# Patient Record
Sex: FEMALE | Race: WHITE | NOT HISPANIC OR LATINO | Employment: FULL TIME | URBAN - METROPOLITAN AREA
[De-identification: names, ages, dates, MRNs, and addresses within clinical notes are randomized per-mention and may not be internally consistent; named-entity substitution may affect disease eponyms.]

---

## 2021-04-16 ENCOUNTER — BULK ORDERING (OUTPATIENT)
Dept: CASE MANAGEMENT | Facility: OTHER | Age: 36
End: 2021-04-16

## 2021-04-16 DIAGNOSIS — Z23 IMMUNIZATION DUE: ICD-10-CM

## 2023-08-03 ENCOUNTER — TELEPHONE (OUTPATIENT)
Dept: GASTROENTEROLOGY | Facility: CLINIC | Age: 38
End: 2023-08-03
Payer: COMMERCIAL

## 2023-08-03 ENCOUNTER — OFFICE VISIT (OUTPATIENT)
Dept: GASTROENTEROLOGY | Facility: CLINIC | Age: 38
End: 2023-08-03
Payer: COMMERCIAL

## 2023-08-03 VITALS
TEMPERATURE: 96.9 F | SYSTOLIC BLOOD PRESSURE: 94 MMHG | DIASTOLIC BLOOD PRESSURE: 62 MMHG | OXYGEN SATURATION: 97 % | HEART RATE: 68 BPM | WEIGHT: 149 LBS | HEIGHT: 67 IN | BODY MASS INDEX: 23.39 KG/M2

## 2023-08-03 DIAGNOSIS — K58.0 IRRITABLE BOWEL SYNDROME WITH DIARRHEA: Primary | ICD-10-CM

## 2023-08-03 PROCEDURE — 99203 OFFICE O/P NEW LOW 30 MIN: CPT | Performed by: INTERNAL MEDICINE

## 2023-08-07 ENCOUNTER — TELEPHONE (OUTPATIENT)
Dept: GASTROENTEROLOGY | Facility: CLINIC | Age: 38
End: 2023-08-07
Payer: COMMERCIAL

## 2023-08-07 NOTE — TELEPHONE ENCOUNTER
Patient called. Advised of the patient assistance program for Xifaxan. Advised will send the paperwork via my chart. Requested she return the completed work to the office. She verb understanding.   [facility] patient assistance program paperwork sent via my chart.

## 2023-08-07 NOTE — TELEPHONE ENCOUNTER
Hub staff attempted to follow warm transfer process and was unsuccessful     Caller: Renetta Macias    Relationship to patient: Self    Best call back number: [phone]     Patient is needing: PT IS CALLING IN REGARDS TO HIGH COST OF MEDICATION XIFAXAN. PT STATES NOONE HAS CALLED HER IN REGARDS TO MYCHART MESSAGE SHE SENT ABOUT SAMPLES OR DIFFERENT MEDICATION. WARM TRANSFER UNSUCCESSFUL.

## 2023-08-09 RX ORDER — NORTRIPTYLINE HYDROCHLORIDE 10 MG/1
10 CAPSULE ORAL NIGHTLY
Qty: 30 CAPSULE | Refills: 3 | Status: SHIPPED | OUTPATIENT
Start: 2023-08-09

## 2023-11-27 PROBLEM — K52.9 CHRONIC DIARRHEA: Status: ACTIVE | Noted: 2023-11-27

## 2023-11-27 NOTE — PROGRESS NOTES
Chief Complaint   Patient presents with   • IBS-D     Renetta Macias is a 37 y.o. female who presents with a h/o chronic diarrhea diagnosed with IBS   HPI    Pt 36 yo female with chronic diarrhea.  Colonoscopy was negative and diagnosed with IBS here for further recommendatyion.  Denies wt loss, chgange in diet or appetite ar family history of IBD    Past Medical History:   Diagnosis Date   • Epilepsy        Current Outpatient Medications:   •  lamoTRIgine  MG tablet sustained-release 24 hour, Take 400 mg by mouth., Disp: , Rfl:   •  topiramate (TOPAMAX) 200 MG tablet, Take 1 tablet by mouth., Disp: , Rfl:   •  nortriptyline (Pamelor) 10 MG capsule, Take 1 capsule by mouth Every Night., Disp: 30 capsule, Rfl: 3  •  riFAXIMin (XIFAXAN) 550 MG tablet, Take 1 tablet by mouth Every 8 (Eight) Hours., Disp: 42 tablet, Rfl: 2  No Known Allergies  Social History     Socioeconomic History   • Marital status: Single   Tobacco Use   • Smoking status: Never   • Smokeless tobacco: Never   Vaping Use   • Vaping Use: Never used   Substance and Sexual Activity   • Alcohol use: Yes     Alcohol/week: 5.0 standard drinks of alcohol     Types: 5 Standard drinks or equivalent per week     Comment: social   • Drug use: Never     Family History   Problem Relation Age of Onset   • Hyperlipidemia Mother    • No Known Problems Father    • No Known Problems Sister      Review of Systems   Constitutional: Negative.    HENT: Negative.     Eyes: Negative.    Respiratory: Negative.     Cardiovascular: Negative.    Gastrointestinal:  Positive for abdominal pain and diarrhea. Negative for abdominal distention, anal bleeding, blood in stool, constipation, nausea, rectal pain and vomiting.   Endocrine: Negative.    Musculoskeletal: Negative.    Skin: Negative.    Allergic/Immunologic: Negative.    Hematological: Negative.    Vitals:    08/03/23 1221   BP: 94/62   Pulse: 68   Temp: 96.9 °F (36.1 °C)   SpO2: 97%     Physical Exam  Vitals and  nursing note reviewed.   Constitutional:       Appearance: Normal appearance. She is well-developed.   HENT:      Head: Normocephalic and atraumatic.   Eyes:      General: No scleral icterus.     Pupils: Pupils are equal, round, and reactive to light.   Cardiovascular:      Rate and Rhythm: Normal rate and regular rhythm.      Heart sounds: Normal heart sounds.   Pulmonary:      Effort: Pulmonary effort is normal. No respiratory distress.      Breath sounds: Normal breath sounds.   Abdominal:      General: Bowel sounds are normal. There is no distension or abdominal bruit.      Palpations: Abdomen is soft. Abdomen is not rigid. There is no shifting dullness, fluid wave, mass or pulsatile mass.      Tenderness: There is no abdominal tenderness. There is no guarding.      Hernia: No hernia is present.   Musculoskeletal:         General: No swelling or tenderness. Normal range of motion.   Skin:     General: Skin is warm and dry.      Coloration: Skin is not jaundiced.      Findings: No rash.   Neurological:      General: No focal deficit present.      Mental Status: She is alert and oriented to person, place, and time.      Cranial Nerves: No cranial nerve deficit.   Psychiatric:         Behavior: Behavior normal.         Thought Content: Thought content normal.   Diagnoses and all orders for this visit:    Irritable bowel syndrome with diarrhea    Other orders  -     riFAXIMin (XIFAXAN) 550 MG tablet; Take 1 tablet by mouth Every 8 (Eight) Hours.    Pt is a 36 yo male with history of chronic diarrhea, s/p colonoscopy with normal mucosa with ongoing diarrhea.  Will give trial xifaxan for the IBS-d and f/u clinically.

## 2024-07-15 ENCOUNTER — OFFICE VISIT (OUTPATIENT)
Dept: INTERNAL MEDICINE | Facility: CLINIC | Age: 39
End: 2024-07-15
Payer: COMMERCIAL

## 2024-07-15 VITALS
OXYGEN SATURATION: 98 % | SYSTOLIC BLOOD PRESSURE: 110 MMHG | WEIGHT: 153 LBS | DIASTOLIC BLOOD PRESSURE: 68 MMHG | BODY MASS INDEX: 24.01 KG/M2 | HEART RATE: 72 BPM | HEIGHT: 67 IN

## 2024-07-15 DIAGNOSIS — Z13.220 SCREENING FOR HYPERLIPIDEMIA: ICD-10-CM

## 2024-07-15 DIAGNOSIS — Z00.00 ANNUAL PHYSICAL EXAM: Primary | ICD-10-CM

## 2024-07-15 PROCEDURE — 99395 PREV VISIT EST AGE 18-39: CPT | Performed by: STUDENT IN AN ORGANIZED HEALTH CARE EDUCATION/TRAINING PROGRAM

## 2024-07-15 NOTE — PROGRESS NOTES
"  Enrrique Sauer D.O.  Internal Medicine  [facility]  [address] [place]  [place] [address]  [phone]    Chief Complaint  Annual checkup    HISTORY    Renetta Macias is a 38 y.o. female who presents to the office today as a  an established patient for their annual preventative exam.     No hospitalization(s) within the last year.     Current exercise regimen: few times per week     Status of chronic medical conditions:    Epilepsy: follows with [facility]. Takes lamictal and Topamax as managed by neurology. States last seizure over 10 years ago.     IBS-D: has seen Dr Lancaster with GI and prescribed a trial of xifaxan and nortriptyline. States she didn't try these.   States she is now taking IB Guard and \"it changed my life\".     Patient's overall comments about their health or any other specific health concerns they report:    GYN History:      *Patient's GYN Practice: [facility].      *regular periods      *no method at present time, not trying to get pregnant      *Previous pregnancies: 0.  Live births: 0.  Miscarriages: 0 . Elective abortions: 0.         Health Maintenance Summary            Overdue - TDAP/TD VACCINES (1 - Tdap) Never done      No completion history exists for this topic.              Overdue - COVID-19 Vaccine (3 - 2023-24 season) Overdue since 9/1/2023 08/27/2021  Imm Admin: COVID-19 (PFIZER) Purple Cap Monovalent    07/30/2021  Imm Admin: COVID-19 (PFIZER) Purple Cap Monovalent              INFLUENZA VACCINE (Yearly - August to March) Next due on 8/1/2024      No completion history exists for this topic.              ANNUAL PHYSICAL (Yearly) Next due on 7/15/2025      07/15/2024  Done    07/12/2023  Done              PAP SMEAR (Every 3 Years) Next due on 1/6/2026 01/06/2023  SCANNED - PAP SMEAR              HEPATITIS C SCREENING  Completed      07/12/2023  Hep C Virus Ab component of Hepatitis C antibody              Pneumococcal Vaccine " "0-64 (Series Information) Aged Out      No completion history exists for this topic.                     No Known Allergies     Outpatient Medications Marked as Taking for the 7/15/24 encounter (Office Visit) with Enrrique Sauer,    Medication Sig Dispense Refill    lamoTRIgine  MG tablet sustained-release 24 hour Take 400 mg by mouth.      topiramate (TOPAMAX) 200 MG tablet Take 2 tablets by mouth Daily.      [DISCONTINUED] topiramate (TOPAMAX) 200 MG tablet Take 1 tablet by mouth.          Past Medical History:   Diagnosis Date    Epilepsy     IBS (irritable bowel syndrome)      Past Surgical History:   Procedure Laterality Date    COLONOSCOPY      approx 2016    CYST REMOVAL      right chin     Family History   Problem Relation Age of Onset    Hyperlipidemia Mother     Supraventricular tachycardia Father     Coronary artery disease Father     No Known Problems Sister     reports that she has never smoked. She has never used smokeless tobacco. She reports current alcohol use of about 5.0 standard drinks of alcohol per week. She reports that she does not use drugs.    Immunization History   Administered Date(s) Administered    COVID-19 (PFIZER) Purple Cap Monovalent 07/30/2021, 08/27/2021        OBJECTIVE    Vital Signs:   /68   Pulse 72   Ht 170.2 cm (67\")   Wt 69.4 kg (153 lb)   SpO2 98%   BMI 23.96 kg/m²     Physical Exam  Vitals reviewed.   Constitutional:       General: She is not in acute distress.     Appearance: Normal appearance. She is normal weight. She is not ill-appearing.   HENT:      Head: Normocephalic and atraumatic.      Right Ear: Tympanic membrane, ear canal and external ear normal. There is no impacted cerumen.      Left Ear: Tympanic membrane, ear canal and external ear normal. There is no impacted cerumen.      Mouth/Throat:      Mouth: Mucous membranes are moist.      Pharynx: No oropharyngeal exudate or posterior oropharyngeal erythema.   Eyes:      General: No scleral " icterus.     Extraocular Movements: Extraocular movements intact.      Conjunctiva/sclera: Conjunctivae normal.      Pupils: Pupils are equal, round, and reactive to light.   Cardiovascular:      Rate and Rhythm: Normal rate and regular rhythm.      Heart sounds: Normal heart sounds. No murmur heard.  Pulmonary:      Effort: Pulmonary effort is normal. No respiratory distress.      Breath sounds: Normal breath sounds. No wheezing.   Abdominal:      General: Bowel sounds are normal. There is no distension.      Palpations: Abdomen is soft.      Tenderness: There is no abdominal tenderness. There is no guarding.   Musculoskeletal:      Cervical back: Neck supple.      Right lower leg: No edema.      Left lower leg: No edema.   Lymphadenopathy:      Cervical: No cervical adenopathy.   Skin:     General: Skin is warm and dry.      Coloration: Skin is not jaundiced.   Neurological:      General: No focal deficit present.      Mental Status: She is alert and oriented to person, place, and time.      Cranial Nerves: No cranial nerve deficit.      Motor: No weakness.   Psychiatric:         Mood and Affect: Mood normal.         Behavior: Behavior normal.         Thought Content: Thought content normal.                         The ASCVD Risk score (Kathy DK, et al., 2019) failed to calculate for the following reasons:    The 2019 ASCVD risk score is only valid for ages 40 to 79     ASSESSMENT & PLAN     #Annual Preventative Health Examination   -Age and sex appropriate physical exam performed and documented. Updated past medical, family, social and surgical histories as well as allergies and care team list. Addressed care gaps listed in the medical record.  -Encouraged annual dental and vision exams as part of their overall health.  -Encouraged minimum of 30 minutes or more of exercise at a brisk walk or higher 5 days per week combined with a well-balanced diet.  -Advised that all women who are planning or capable of  pregnancy take a daily supplement containing 0.4 to 0.8 mg (400 to 800 ?g) of folic acid.  -Immunizations reviewed and updated in EMR. Td and COVID19 recommended. Pt declined. She states she has received HPV in the past.  -Lipid screening:  Will screen for hyperlipidemia today and calculate ASCVD risk if appropriate. .   -Aspirin for primary or secondary prevention: Not applicable, patient is less than age 50.  -Depression and Anxiety screening: Patient denies symptom of anxiety or depression.  -Diabetes screening:  Screening not indicated at this time.   -Tobacco use screening: Conducted and addressed if indicated.   -Alcohol use screening: Conducted and addressed if indicated.   -Illicit drug screening: Conducted and addressed if indicated.   -Hypertension screening: Patient screened negative for HTN today.  -HIV screening: Patient has already received HIV screening and it was negative. Patient declined repeat screening today.   -Syphilis screening: Syphilis screening not indicated.  -Hepatitis B virus screening: Patient has known diagnosis of hepatitis B, screening not indicated.  -Hepatitis C virus screening:  Patient has already completed Hepatitis C screening. Negative screening on file.   -Colon cancer screening: Patient is less than age 45 and colon cancer screening is not indicated.  -Lung cancer screening: Patient is less than age 50, screening not indicated.  -Cervical cancer screening: Lasp pap : was normal 1/2023  -Breast cancer screening:  Breast cancer screening is not applicable at this time.  -BRCA related cancer screening:  Patient does not have a known personal or family history.   -Osteoporosis screening: Informed patient that the USPSTF recommends screening for osteoporosis with bone measurement testing to prevent osteoporotic fractures in women 65 years and older. Screening is not applicable at this time.    Follow up in 1 year for annual physical exam.    Patient/family had no further  questions at this time and verbalized understanding of the plan discussed today.

## 2024-07-16 LAB
ALBUMIN SERPL-MCNC: 4.5 G/DL (ref 3.9–4.9)
ALP SERPL-CCNC: 79 IU/L (ref 44–121)
ALT SERPL-CCNC: 24 IU/L (ref 0–32)
AST SERPL-CCNC: 20 IU/L (ref 0–40)
BASOPHILS # BLD AUTO: 0 X10E3/UL (ref 0–0.2)
BASOPHILS NFR BLD AUTO: 1 %
BILIRUB SERPL-MCNC: 0.4 MG/DL (ref 0–1.2)
BUN SERPL-MCNC: 17 MG/DL (ref 6–20)
BUN/CREAT SERPL: 19 (ref 9–23)
CALCIUM SERPL-MCNC: 9.2 MG/DL (ref 8.7–10.2)
CHLORIDE SERPL-SCNC: 106 MMOL/L (ref 96–106)
CHOLEST SERPL-MCNC: 198 MG/DL (ref 100–199)
CO2 SERPL-SCNC: 21 MMOL/L (ref 20–29)
CREAT SERPL-MCNC: 0.9 MG/DL (ref 0.57–1)
EGFRCR SERPLBLD CKD-EPI 2021: 84 ML/MIN/1.73
EOSINOPHIL # BLD AUTO: 0.2 X10E3/UL (ref 0–0.4)
EOSINOPHIL NFR BLD AUTO: 4 %
ERYTHROCYTE [DISTWIDTH] IN BLOOD BY AUTOMATED COUNT: 12 % (ref 11.7–15.4)
GLOBULIN SER CALC-MCNC: 2.2 G/DL (ref 1.5–4.5)
GLUCOSE SERPL-MCNC: 81 MG/DL (ref 70–99)
HCT VFR BLD AUTO: 43.6 % (ref 34–46.6)
HDLC SERPL-MCNC: 76 MG/DL
HGB BLD-MCNC: 14.8 G/DL (ref 11.1–15.9)
IMM GRANULOCYTES # BLD AUTO: 0 X10E3/UL (ref 0–0.1)
IMM GRANULOCYTES NFR BLD AUTO: 1 %
LDLC SERPL CALC-MCNC: 100 MG/DL (ref 0–99)
LYMPHOCYTES # BLD AUTO: 1.6 X10E3/UL (ref 0.7–3.1)
LYMPHOCYTES NFR BLD AUTO: 33 %
MCH RBC QN AUTO: 31.4 PG (ref 26.6–33)
MCHC RBC AUTO-ENTMCNC: 33.9 G/DL (ref 31.5–35.7)
MCV RBC AUTO: 92 FL (ref 79–97)
MONOCYTES # BLD AUTO: 0.4 X10E3/UL (ref 0.1–0.9)
MONOCYTES NFR BLD AUTO: 8 %
NEUTROPHILS # BLD AUTO: 2.5 X10E3/UL (ref 1.4–7)
NEUTROPHILS NFR BLD AUTO: 53 %
PLATELET # BLD AUTO: 180 X10E3/UL (ref 150–450)
POTASSIUM SERPL-SCNC: 3.7 MMOL/L (ref 3.5–5.2)
PROT SERPL-MCNC: 6.7 G/DL (ref 6–8.5)
RBC # BLD AUTO: 4.72 X10E6/UL (ref 3.77–5.28)
SODIUM SERPL-SCNC: 140 MMOL/L (ref 134–144)
TRIGL SERPL-MCNC: 129 MG/DL (ref 0–149)
VLDLC SERPL CALC-MCNC: 22 MG/DL (ref 5–40)
WBC # BLD AUTO: 4.7 X10E3/UL (ref 3.4–10.8)

## 2025-07-21 ENCOUNTER — OFFICE VISIT (OUTPATIENT)
Dept: INTERNAL MEDICINE | Facility: CLINIC | Age: 40
End: 2025-07-21
Payer: COMMERCIAL

## 2025-07-21 VITALS
DIASTOLIC BLOOD PRESSURE: 58 MMHG | OXYGEN SATURATION: 97 % | SYSTOLIC BLOOD PRESSURE: 108 MMHG | BODY MASS INDEX: 23.86 KG/M2 | HEART RATE: 69 BPM | RESPIRATION RATE: 16 BRPM | WEIGHT: 152 LBS | HEIGHT: 67 IN

## 2025-07-21 DIAGNOSIS — Z13.220 SCREENING FOR HYPERLIPIDEMIA: ICD-10-CM

## 2025-07-21 DIAGNOSIS — Z00.00 ANNUAL PHYSICAL EXAM: Primary | ICD-10-CM

## 2025-07-21 LAB
ALBUMIN SERPL-MCNC: 4.3 G/DL (ref 3.5–5.2)
ALBUMIN/GLOB SERPL: 2.3 G/DL
ALP SERPL-CCNC: 69 U/L (ref 39–117)
ALT SERPL-CCNC: 20 U/L (ref 1–33)
AST SERPL-CCNC: 18 U/L (ref 1–32)
BASOPHILS # BLD AUTO: 0.04 10*3/MM3 (ref 0–0.2)
BASOPHILS NFR BLD AUTO: 0.9 % (ref 0–1.5)
BILIRUB SERPL-MCNC: 0.3 MG/DL (ref 0–1.2)
BUN SERPL-MCNC: 13 MG/DL (ref 6–20)
BUN/CREAT SERPL: 14.9 (ref 7–25)
CALCIUM SERPL-MCNC: 9 MG/DL (ref 8.6–10.5)
CHLORIDE SERPL-SCNC: 109 MMOL/L (ref 98–107)
CHOLEST SERPL-MCNC: 172 MG/DL (ref 0–200)
CO2 SERPL-SCNC: 21.8 MMOL/L (ref 22–29)
CREAT SERPL-MCNC: 0.87 MG/DL (ref 0.57–1)
EGFRCR SERPLBLD CKD-EPI 2021: 87.6 ML/MIN/1.73
EOSINOPHIL # BLD AUTO: 0.18 10*3/MM3 (ref 0–0.4)
EOSINOPHIL NFR BLD AUTO: 4.1 % (ref 0.3–6.2)
ERYTHROCYTE [DISTWIDTH] IN BLOOD BY AUTOMATED COUNT: 12 % (ref 12.3–15.4)
GLOBULIN SER CALC-MCNC: 1.9 GM/DL
GLUCOSE SERPL-MCNC: 89 MG/DL (ref 65–99)
HCT VFR BLD AUTO: 44.4 % (ref 34–46.6)
HDLC SERPL-MCNC: 70 MG/DL (ref 40–60)
HGB BLD-MCNC: 14.6 G/DL (ref 12–15.9)
IMM GRANULOCYTES # BLD AUTO: 0 10*3/MM3 (ref 0–0.05)
IMM GRANULOCYTES NFR BLD AUTO: 0 % (ref 0–0.5)
LDLC SERPL CALC-MCNC: 84 MG/DL (ref 0–100)
LYMPHOCYTES # BLD AUTO: 1.46 10*3/MM3 (ref 0.7–3.1)
LYMPHOCYTES NFR BLD AUTO: 33.3 % (ref 19.6–45.3)
MCH RBC QN AUTO: 31.5 PG (ref 26.6–33)
MCHC RBC AUTO-ENTMCNC: 32.9 G/DL (ref 31.5–35.7)
MCV RBC AUTO: 95.9 FL (ref 79–97)
MONOCYTES # BLD AUTO: 0.26 10*3/MM3 (ref 0.1–0.9)
MONOCYTES NFR BLD AUTO: 5.9 % (ref 5–12)
NEUTROPHILS # BLD AUTO: 2.44 10*3/MM3 (ref 1.7–7)
NEUTROPHILS NFR BLD AUTO: 55.8 % (ref 42.7–76)
NRBC BLD AUTO-RTO: 0 /100 WBC (ref 0–0.2)
PLATELET # BLD AUTO: 161 10*3/MM3 (ref 140–450)
POTASSIUM SERPL-SCNC: 4 MMOL/L (ref 3.5–5.2)
PROT SERPL-MCNC: 6.2 G/DL (ref 6–8.5)
RBC # BLD AUTO: 4.63 10*6/MM3 (ref 3.77–5.28)
SODIUM SERPL-SCNC: 140 MMOL/L (ref 136–145)
TRIGL SERPL-MCNC: 100 MG/DL (ref 0–150)
VLDLC SERPL CALC-MCNC: 18 MG/DL (ref 5–40)
WBC # BLD AUTO: 4.38 10*3/MM3 (ref 3.4–10.8)

## 2025-07-21 PROCEDURE — 99395 PREV VISIT EST AGE 18-39: CPT | Performed by: STUDENT IN AN ORGANIZED HEALTH CARE EDUCATION/TRAINING PROGRAM

## 2025-07-21 NOTE — PROGRESS NOTES
"  Enrrique Sauer DO, FACP  Internal Medicine  [facility]  [address], [address]  [place] [address]  [phone]    Chief Complaint  Annual checkup    HISTORY    Renetta Macias is a 39 y.o. female who presents to the office today as a  an established patient for their annual preventative exam.     No hospitalization(s) within the last year.     Current exercise regimen: \"some\", states she does her peleton 2-3 days weekly.     Status of chronic medical conditions:    Epilepsy: follows with [facility]. Takes lamictal and Topamax as managed by neurology. States last seizure over 10 years ago.      IBS-D: previously followed with [facility]  GI and prescribed a trial of xifaxan and nortriptyline. States she didn't try these. Had improvement with IB Guard.    Patient's overall comments about their health or any other specific health concerns they report: none    GYN History:      *Patient's GYN Practice: [facility].      *regular periods      *no method at present time, not trying to get pregnant      *Previous pregnancies: 0.  Live births: 0.  Miscarriages: 0 . Elective abortions: 0.      Health Maintenance Summary            Current Care Gaps       COVID-19 Vaccine (3 - 2024-25 season) Overdue since 9/1/2024 08/27/2021  Imm Admin: COVID-19 (PFIZER) Purple Cap Monovalent    07/30/2021  Imm Admin: COVID-19 (PFIZER) Purple Cap Monovalent              TDAP/TD VACCINES (1 - Tdap) Never done     No completion history exists for this topic.                      Upcoming       INFLUENZA VACCINE (Yearly - October to March) Next due on 10/1/2025     No completion history exists for this topic.              PAP SMEAR (Every 3 Years) Next due on 1/6/2026 01/06/2023  SCANNED - PAP SMEAR              ANNUAL PHYSICAL (Yearly) Next due on 7/21/2026 07/21/2025  Done    07/15/2024  Done    07/12/2023  Done                      Completed or No Longer Recommended       HEPATITIS C SCREENING " " Completed      07/12/2023  Hep C Virus Ab component of Hepatitis C antibody              Pneumococcal Vaccine 0-49 (Series Information) Aged Out     No completion history exists for this topic.                             No Known Allergies     Outpatient Medications Marked as Taking for the 7/21/25 encounter (Office Visit) with Enrrique Sauer, DO   Medication Sig Dispense Refill   • lamoTRIgine  MG tablet sustained-release 24 hour Take 400 mg by mouth.     • topiramate (TOPAMAX) 200 MG tablet Take 2 tablets by mouth Daily.          Past Medical History:   Diagnosis Date   • Epilepsy    • IBS (irritable bowel syndrome)      Past Surgical History:   Procedure Laterality Date   • COLONOSCOPY      approx 2016   • CYST REMOVAL      right chin     Family History   Problem Relation Age of Onset   • Hyperlipidemia Mother    • Supraventricular tachycardia Father    • Coronary artery disease Father    • Cancer Father    • Depression Father    • Heart disease Father    • Prostate cancer Father 71   • No Known Problems Sister     reports that she has never smoked. She has never used smokeless tobacco. She reports current alcohol use of about 5.0 standard drinks of alcohol per week. She reports that she does not use drugs.    Immunization History   Administered Date(s) Administered   • COVID-19 (PFIZER) Purple Cap Monovalent 07/30/2021, 08/27/2021        OBJECTIVE    Vital Signs:   /58   Pulse 69   Resp 16   Ht 170.2 cm (67\")   Wt 68.9 kg (152 lb)   SpO2 97%   BMI 23.81 kg/m²     Physical Exam  Vitals reviewed.   Constitutional:       General: She is not in acute distress.     Appearance: Normal appearance. She is normal weight. She is not ill-appearing.   HENT:      Head: Normocephalic and atraumatic.      Right Ear: Tympanic membrane, ear canal and external ear normal. There is no impacted cerumen.      Left Ear: Tympanic membrane, ear canal and external ear normal. There is no impacted cerumen.      " Mouth/Throat:      Mouth: Mucous membranes are moist.      Pharynx: No oropharyngeal exudate or posterior oropharyngeal erythema.   Eyes:      General: No scleral icterus.     Extraocular Movements: Extraocular movements intact.      Conjunctiva/sclera: Conjunctivae normal.      Pupils: Pupils are equal, round, and reactive to light.   Cardiovascular:      Rate and Rhythm: Normal rate and regular rhythm.      Heart sounds: Normal heart sounds. No murmur heard.  Pulmonary:      Effort: Pulmonary effort is normal. No respiratory distress.      Breath sounds: Normal breath sounds. No wheezing.   Abdominal:      General: Bowel sounds are normal. There is no distension.      Palpations: Abdomen is soft.      Tenderness: There is no abdominal tenderness. There is no guarding.   Musculoskeletal:      Cervical back: Neck supple.      Right lower leg: No edema.      Left lower leg: No edema.   Lymphadenopathy:      Cervical: No cervical adenopathy.   Skin:     General: Skin is warm and dry.      Coloration: Skin is not jaundiced.   Neurological:      General: No focal deficit present.      Mental Status: She is alert and oriented to person, place, and time.      Cranial Nerves: No cranial nerve deficit.      Motor: No weakness.   Psychiatric:         Mood and Affect: Mood normal.         Behavior: Behavior normal.         Thought Content: Thought content normal.                       The ASCVD Risk score ([place] DK, et al., 2019) failed to calculate for the following reasons:    The 2019 ASCVD risk score is only valid for ages 40 to 79     ASSESSMENT & PLAN     #Annual Preventative Health Examination   -Age and sex appropriate physical exam performed and documented. Updated past medical, family, social and surgical histories as well as allergies and care team list. Addressed care gaps listed in the medical record.  -Encouraged annual dental and vision exams as part of their overall health.  -Encouraged minimum of 30 minutes  or more of exercise at a brisk walk or higher 5 days per week combined with a well-balanced diet.  -Immunizations reviewed and updated in EMR. Td, Influenza, and COVID19 recommended.  -Lipid screening:  Patient is less than age 40 and has had a screening lipid panel in the last 4 years that was normal..   -Aspirin for primary or secondary prevention: Not applicable, patient is less than age 50.  -Depression and Anxiety screening: Patient denies symptom of anxiety or depression.  -Diabetes screening:  Screening not indicated at this time.   -Tobacco use screening: Conducted and addressed if indicated.   -Alcohol use screening: Conducted and addressed if indicated.   -Illicit drug screening: Conducted and addressed if indicated.   -Hypertension screening: Patient screened negative for HTN today.  -HIV screening: Patient has already received HIV screening and it was negative. Patient declined repeat screening today.   -Syphilis screening: Syphilis screening not indicated.  -Hepatitis B virus screening: Patient has known diagnosis of hepatitis B, screening not indicated.  -Hepatitis C virus screening:  Patient has already completed Hepatitis C screening. Negative screening on file.   -Colon cancer screening: Patient is less than age 45 and colon cancer screening is not indicated.  -Lung cancer screening: Patient is less than age 50, screening not indicated.  -Cervical cancer screening: Lasp pap : was normal 1/2025 with cotesting  -Breast cancer screening:  Breast cancer screening is not applicable at this time.  -BRCA related cancer screening:  Patient does not have a known personal or family history.   -Osteoporosis screening: Informed patient that the USPSTF recommends screening for osteoporosis with bone measurement testing to prevent osteoporotic fractures in women 65 years and older. Screening is not applicable at this time.       Follow up in 1 year for annual physical exam.    Patient/family had no further  questions at this time and verbalized understanding of the plan discussed today.